# Patient Record
Sex: FEMALE | Race: WHITE | NOT HISPANIC OR LATINO | Employment: UNEMPLOYED | URBAN - METROPOLITAN AREA
[De-identification: names, ages, dates, MRNs, and addresses within clinical notes are randomized per-mention and may not be internally consistent; named-entity substitution may affect disease eponyms.]

---

## 2017-07-25 ENCOUNTER — OFFICE VISIT (OUTPATIENT)
Dept: OPHTHALMOLOGY | Facility: CLINIC | Age: 22
End: 2017-07-25
Attending: OPHTHALMOLOGY
Payer: COMMERCIAL

## 2017-07-25 DIAGNOSIS — H35.103 ROP (RETINOPATHY OF PREMATURITY), BILATERAL: Primary | ICD-10-CM

## 2017-07-25 PROCEDURE — 92015 DETERMINE REFRACTIVE STATE: CPT | Mod: ZF

## 2017-07-25 PROCEDURE — 99213 OFFICE O/P EST LOW 20 MIN: CPT

## 2017-07-25 PROCEDURE — 92250 FUNDUS PHOTOGRAPHY W/I&R: CPT | Mod: ZF | Performed by: OPHTHALMOLOGY

## 2017-07-25 ASSESSMENT — REFRACTION_MANIFEST
OD_AXIS: 005
OS_CYLINDER: +1.00
OD_SPHERE: -18.00
OD_CYLINDER: +3.00
OS_AXIS: 080

## 2017-07-25 ASSESSMENT — CUP TO DISC RATIO
OD_RATIO: NO SIGNIFICANT CUPPING WITH GLIMPSES
OS_RATIO: NO SIGNIFICANT CUPPING WITH GLIMPSES

## 2017-07-25 ASSESSMENT — EXTERNAL EXAM - RIGHT EYE: OD_EXAM: MILD PTOSIS

## 2017-07-25 ASSESSMENT — TONOMETRY
OD_IOP_MMHG: 09
IOP_METHOD: TONOPEN
OS_IOP_MMHG: 11

## 2017-07-25 ASSESSMENT — VISUAL ACUITY
METHOD: SNELLEN - LINEAR
OD_CC: 20/200
OS_CC: 20/70
CORRECTION_TYPE: GLASSES
OS_CC+: -2

## 2017-07-25 ASSESSMENT — CONF VISUAL FIELD
OS_INFERIOR_TEMPORAL_RESTRICTION: 3
OS_INFERIOR_NASAL_RESTRICTION: 3
OS_SUPERIOR_NASAL_RESTRICTION: 3
OS_SUPERIOR_TEMPORAL_RESTRICTION: 3

## 2017-07-25 ASSESSMENT — REFRACTION_WEARINGRX
OD_AXIS: 005
OD_CYLINDER: +3.00
OD_SPHERE: -19.00
OS_SPHERE: -15.00
OS_AXIS: 180
OS_CYLINDER: +1.00

## 2017-07-25 ASSESSMENT — EXTERNAL EXAM - LEFT EYE: OS_EXAM: MILD PTOSIS

## 2017-07-25 ASSESSMENT — SLIT LAMP EXAM - LIDS
COMMENTS: NORMAL
COMMENTS: NORMAL

## 2017-07-25 NOTE — NURSING NOTE
Chief Complaints and History of Present Illnesses   Patient presents with     Follow Up For     Cicatricial ROP, both eyes     HPI    Affected eye(s):  Both   Symptoms:     No decreased vision   No Dryness   No itching         Do you have eye pain now?:  No      Comments:  Annual follow up for Cicatricial ROP, both eyes.  The patient notes she is doing well and her vision is stable.  PRINCESS Mcneill 3:12 PM 07/25/2017

## 2017-07-25 NOTE — MR AVS SNAPSHOT
After Visit Summary   2017    Belen Erickson    MRN: [id number]           Patient Information     Date Of Birth          1995        Visit Information        Provider Department      2017 3:15 PM [facility]        Today's Diagnoses     ROP (retinopathy of prematurity), bilateral    -  1       Follow-ups after your visit        Follow-up notes from your care team     Return in about 1 year (around 2018) for Fundus photos OU (Optos).      Future tests that were ordered for you today     Open Future Orders        Priority Expected Expires Ordered    Fundus Photos OU (both eyes) Routine  2019            Who to contact     Please call your clinic at [phone] to:    Ask questions about your health    Make or cancel appointments    Discuss your medicines    Learn about your test results    Speak to your doctor   If you have compliments or concerns about an experience at your clinic, or if you wish to file a complaint, please contact [facility] Patient Relations at [phone] or email us at [email or web address]         Additional Information About Your Visit        MyChart Information     Bookya is an electronic gateway that provides easy, online access to your medical records. With Bookya, you can request a clinic appointment, read your test results, renew a prescription or communicate with your care team.     To sign up for Bookya visit the website at [email or web address]   You will be asked to enter the access code listed below, as well as some personal information. Please follow the directions to create your username and password.     Your access code is: [email or web address]  Expires: 10/9/2017  6:31 AM     Your access code will  in 90 days. If you need help or a new code, please contact your [facility] or call [phone] for assistance.        Care  EveryWhere ID     This is your Care EveryWhere ID. This could be used by other organizations to access your [facility] medical records  [id number]         Blood Pressure from Last 3 Encounters:   No data found for BP    Weight from Last 3 Encounters:   No data found for Wt              We Performed the Following     Fundus Photos OU (both eyes)        Primary Care Provider Office Phone # Fax #    Alejandra Johnson [phone]       [address] [address]        Equal Access to Services     WOLFGANG SMITH : Hadii aad ku hadasho Soomaali, waaxda luqadaha, qaybta kaalmada adeegyada, waxay idiin hayrhiannonn jose braga . So [facility] [phone].    ATENCIÓN: Si habla español, tiene a barnett disposición servicios gratuitos de asistencia lingüística. [facility] [phone].    We comply with applicable federal civil rights laws and [place] laws. We do not discriminate on the basis of race, color, national origin, age, disability sex, sexual orientation or gender identity.            Thank you!     Thank you for choosing [facility]  for your care. Our goal is always to provide you with excellent care. Hearing back from our patients is one way we can continue to improve our services. Please take a few minutes to complete the written survey that you may receive in the mail after your visit with us. Thank you!             Your Updated Medication List - Protect others around you: Learn how to safely use, store and throw away your medicines at www.disposemymeds.org.      Notice  As of 7/25/2017  6:09 PM    You have not been prescribed any medications.

## 2017-07-25 NOTE — PROGRESS NOTES
CC -   F/u cicatricial ROP    INTERVAL HISTORY -   No changes since MING  Chronic photophobia. Denies flashes or floaters. Wears glasses full time, high myope. Father often notes slight head tilt, looking over glasses.       HPI -   Belen Erickson is a  22 year old year-old patient presenting for annual evaluation of cicatricial ROP. Followed with Dr Oliva as well.        PAST OCULAR SURGERY  Strabismus surgery- 2001 and 2004  Amblyopia, right eye  High myopia  Microcornea  Esotropia  Nystagmus        ASSESSMENT & PLAN    1. 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  Amblyopia OD      3. Esotropia, torticollis, and multivector nystagmus   -s/p strabismus surgery at ages 4 and 6- stable   -continue to monitor      4.High myopia and astigmatism   -last cycloplegic refraction (2015)      return to clinic: 1 year, photos OU (Optos)    Raúl Clark MD  PGY2, Ophthalmology      ATTESTATION     Attending Physician Attestation:      Complete documentation of historical and exam elements from today's encounter can be found in the full encounter summary report (not reduplicated in this progress note).  I personally obtained the chief complaint(s) and history of present illness.  I confirmed and edited as necessary the review of systems, past medical/surgical history, family history, social history, and examination findings as documented by others; and I examined the patient myself.  I personally reviewed the relevant tests, images,  and reports as documented above.  I formulated and edited as necessary the assessment and plan and discussed the findings and management plan with the patient and family        Rebeca Oliver MD, PhD  , Vitreoretinal Surgery  Department of Ophthalmology  [facility]

## 2017-08-16 ENCOUNTER — TELEPHONE (OUTPATIENT)
Dept: OPHTHALMOLOGY | Facility: CLINIC | Age: 22
End: 2017-08-16

## 2017-08-16 NOTE — TELEPHONE ENCOUNTER
----- Message from Jessica Vaughan sent at 8/16/2017  3:01 PM CDT -----  Regarding: Pt father calling asking for pt eye glass Rx to be sent to address  Contact: [phone]  Pt father calling asking for pt eye glass Rx. To be sent to address on file.     Thank You,  Jessica    Please DO NOT send this message and/or reply back to sender.  Call Center Representatives DO NOT respond to messages.

## 2018-07-17 ENCOUNTER — OFFICE VISIT (OUTPATIENT)
Dept: OPHTHALMOLOGY | Facility: CLINIC | Age: 23
End: 2018-07-17
Attending: OPHTHALMOLOGY
Payer: COMMERCIAL

## 2018-07-17 DIAGNOSIS — H35.103 ROP (RETINOPATHY OF PREMATURITY), BILATERAL: ICD-10-CM

## 2018-07-17 PROCEDURE — G0463 HOSPITAL OUTPT CLINIC VISIT: HCPCS | Mod: ZF

## 2018-07-17 PROCEDURE — 92250 FUNDUS PHOTOGRAPHY W/I&R: CPT | Mod: ZF | Performed by: OPHTHALMOLOGY

## 2018-07-17 ASSESSMENT — TONOMETRY
OS_IOP_MMHG: 13
OD_IOP_MMHG: 16
IOP_METHOD: TONOPEN

## 2018-07-17 ASSESSMENT — REFRACTION_WEARINGRX
OD_CYLINDER: +3.00
OS_AXIS: 180
OD_AXIS: 005
OS_SPHERE: -15.00
OS_CYLINDER: +1.00
OD_SPHERE: -19.00

## 2018-07-17 ASSESSMENT — VISUAL ACUITY
OD_CC: 20/150
CORRECTION_TYPE: GLASSES
OS_CC: 20/60
OS_CC+: -1
METHOD: SNELLEN - LINEAR

## 2018-07-17 ASSESSMENT — EXTERNAL EXAM - RIGHT EYE: OD_EXAM: MILD PTOSIS

## 2018-07-17 ASSESSMENT — SLIT LAMP EXAM - LIDS
COMMENTS: NORMAL
COMMENTS: NORMAL

## 2018-07-17 ASSESSMENT — EXTERNAL EXAM - LEFT EYE: OS_EXAM: MILD PTOSIS

## 2018-07-17 ASSESSMENT — CUP TO DISC RATIO
OD_RATIO: NO SIGNIFICANT CUPPING WITH GLIMPSES
OS_RATIO: NO SIGNIFICANT CUPPING WITH GLIMPSES

## 2018-07-17 NOTE — MR AVS SNAPSHOT
After Visit Summary   2018    Belen Erickson    MRN: [id number]           Patient Information     Date Of Birth          1995        Visit Information        Provider Department      2018 9:45 AM [facility]        Today's Diagnoses     ROP (retinopathy of prematurity), bilateral - Both Eyes           Follow-ups after your visit        Follow-up notes from your care team     Return in about 1 year (around 2019) for DFE, Optos, Fundus photos.      Future tests that were ordered for you today     Open Future Orders        Priority Expected Expires Ordered    Fundus Photos OU (both eyes) Routine  2020    Fundus Photos OU (both eyes) Routine  2020            Who to contact     Please call your clinic at [phone] to:    Ask questions about your health    Make or cancel appointments    Discuss your medicines    Learn about your test results    Speak to your doctor            Additional Information About Your Visit        MyChart Information     USINE IO is an electronic gateway that provides easy, online access to your medical records. With USINE IO, you can request a clinic appointment, read your test results, renew a prescription or communicate with your care team.     To sign up for USINE IO visit the website at [email or web address]   You will be asked to enter the access code listed below, as well as some personal information. Please follow the directions to create your username and password.     Your access code is: [email or web address]  Expires: 10/1/2018  6:31 AM     Your access code will  in 90 days. If you need help or a new code, please contact your [facility] or call [phone] for assistance.        Care EveryWhere ID     This is your Care EveryWhere ID. This could be used by other organizations to access your [facility] medical records  [id number]         Blood Pressure from Last  3 Encounters:   No data found for BP    Weight from Last 3 Encounters:   No data found for Wt              We Performed the Following     Fundus Photos OU (both eyes)        Primary Care Provider Office Phone # Fax #    Alejandra Johnson [phone]       [address] [address]        Equal Access to Services     WOLFGANG SMITH : Hadii aad ku hadasho Soomaali, waaxda luqadaha, qaybta kaalmada adeegyada, waxay richiein hayaan jose braga . So [facility] [phone].    ATENCIÓN: Si habla español, tiene a barnett disposición servicios gratuitos de asistencia lingüística. iVvame al [phone].    We comply with applicable federal civil rights laws and [place] laws. We do not discriminate on the basis of race, color, national origin, age, disability, sex, sexual orientation, or gender identity.            Thank you!     Thank you for choosing [facility]  for your care. Our goal is always to provide you with excellent care. Hearing back from our patients is one way we can continue to improve our services. Please take a few minutes to complete the written survey that you may receive in the mail after your visit with us. Thank you!             Your Updated Medication List - Protect others around you: Learn how to safely use, store and throw away your medicines at www.disposemymeds.org.      Notice  As of 7/17/2018 11:01 AM    You have not been prescribed any medications.

## 2018-07-17 NOTE — NURSING NOTE
Chief Complaints and History of Present Illnesses   Patient presents with     Follow Up For     1 year follow up Cicatricial ROP, both eyes     HPI    Affected eye(s):  Both   Symptoms:     No floaters   No flashes   No redness   No tearing   No Dryness         Do you have eye pain now?:  No      Comments:  Pt states vision is the same as last visit. No eye pain today.    Marques HILL July 17, 2018 10:05 AM

## 2018-07-17 NOTE — PROGRESS NOTES
CC -   F/u cicatricial ROP    INTERVAL HISTORY -   No changes since MING  Chronic photophobia. Denies flashes or floaters. Wears glasses full time, high myope. Father often notes slight head tilt, looking over glasses.       HPI -   Belen Erickson is a  23 year old year-old patient presenting for annual evaluation of cicatricial ROP. Followed with Dr Oliva as well.   Very photosensitive and has trouble with DFE       PAST OCULAR SURGERY  Strabismus surgery- 2001 and 2004  Amblyopia, right eye  High myopia  Microcornea  Esotropia  Nystagmus    RETINAL IMAGING  Optos fundus photos 7/17/18  Right eye blonde fundus, tortuous vessels, attached  Left eye blonde fundus, tortuous vessels, attached      ASSESSMENT & PLAN    1. 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  Amblyopia OD      3. Esotropia, torticollis, and multivector nystagmus   -s/p strabismus surgery at ages 4 and 6- stable   -continue to monitor      4.High myopia and astigmatism   -last cycloplegic refraction (2015)      return to clinic: 1 year, photos OU (Optos)    Irvin Bland MD, PhD  Vitreoretinal Surgery Fellow      ATTESTATION     Attending Physician Attestation:      Complete documentation of historical and exam elements from today's encounter can be found in the full encounter summary report (not reduplicated in this progress note).  I personally obtained the chief complaint(s) and history of present illness.  I confirmed and edited as  necessary the review of systems, past medical/surgical history, family history, social history, and examination findings as documented by others; and I examined the patient myself.  I personally reviewed the relevant tests, images, and reports as documented above.  I personally reviewed the ophthalmic test(s) associated with this encounter, agree with the interpretation(s) as documented by the resident/fellow, and have edited the corresponding report(s) as necessary.   I formulated and edited as necessary the assessment and plan and discussed the findings and management plan with the patient and family    Rebeca Oliver MD, PhD  , Vitreoretinal Surgery  Department of Ophthalmology  [facility]

## 2019-04-22 ENCOUNTER — TELEPHONE (OUTPATIENT)
Dept: OPHTHALMOLOGY | Facility: CLINIC | Age: 24
End: 2019-04-22

## 2019-04-22 NOTE — TELEPHONE ENCOUNTER
[facility] Call Center    Phone Message    May a detailed message be left on voicemail: yes    Reason for Call: Other: Pt needs a new pair of glasses and the dad would like to come into the clinic to pick it up. Please call the home ph. to confirm as soon as possible     Action Taken: Message routed to:  [facility] ([facility] see above

## 2019-04-23 NOTE — TELEPHONE ENCOUNTER
[facility] Call Center    Phone Message    May a detailed message be left on voicemail: yes    Reason for Call: Other: The pt's dad called - please fax the glasses RX to [facility] in [facility] at F [phone]. Thanks.     Action Taken: Message routed to:  [facility] ([facility]

## 2019-07-16 ENCOUNTER — OFFICE VISIT (OUTPATIENT)
Dept: OPHTHALMOLOGY | Facility: CLINIC | Age: 24
End: 2019-07-16
Attending: OPHTHALMOLOGY
Payer: COMMERCIAL

## 2019-07-16 DIAGNOSIS — H35.103 ROP (RETINOPATHY OF PREMATURITY), BILATERAL: Primary | ICD-10-CM

## 2019-07-16 PROCEDURE — 92250 FUNDUS PHOTOGRAPHY W/I&R: CPT | Mod: ZF | Performed by: OPHTHALMOLOGY

## 2019-07-16 PROCEDURE — G0463 HOSPITAL OUTPT CLINIC VISIT: HCPCS | Mod: ZF

## 2019-07-16 ASSESSMENT — TONOMETRY
OS_IOP_MMHG: 15
IOP_METHOD: TONOPEN
OD_IOP_MMHG: 13

## 2019-07-16 ASSESSMENT — CONF VISUAL FIELD
OS_SUPERIOR_TEMPORAL_RESTRICTION: 3
OS_INFERIOR_NASAL_RESTRICTION: 3
OS_INFERIOR_TEMPORAL_RESTRICTION: 3
OS_SUPERIOR_NASAL_RESTRICTION: 3

## 2019-07-16 ASSESSMENT — EXTERNAL EXAM - RIGHT EYE: OD_EXAM: MILD PTOSIS

## 2019-07-16 ASSESSMENT — VISUAL ACUITY
CORRECTION_TYPE: GLASSES
OS_CC: 20/70
METHOD: SNELLEN - LINEAR
OD_CC: 20/300

## 2019-07-16 ASSESSMENT — SLIT LAMP EXAM - LIDS
COMMENTS: NORMAL
COMMENTS: NORMAL

## 2019-07-16 ASSESSMENT — REFRACTION_WEARINGRX
OD_SPHERE: -18.00
OD_AXIS: 005
OS_SPHERE: -15.00
SPECS_TYPE: SVL
OD_CYLINDER: +3.00
OS_AXIS: 081
OS_CYLINDER: +1.00

## 2019-07-16 ASSESSMENT — CUP TO DISC RATIO
OS_RATIO: NO SIGNIFICANT CUPPING WITH GLIMPSES
OD_RATIO: NO SIGNIFICANT CUPPING WITH GLIMPSES

## 2019-07-16 ASSESSMENT — EXTERNAL EXAM - LEFT EYE: OS_EXAM: MILD PTOSIS

## 2019-07-16 NOTE — PROGRESS NOTES
CC -   F/u cicatricial ROP    INTERVAL HISTORY -   No changes since MING. Vision seems stable.   Chronic photophobia. Denies flashes or floaters. Wears glasses full time, high myope. Father often notes slight head tilt, looking over glasses frequently. Pt in school at [employer], gets vision assistance. 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       HPI -   Belen Erickson is a  24 year old year-old patient presenting for annual evaluation of cicatricial ROP. Was followed by Dr Oliva.  Very photosensitive and has trouble with DFE       PAST OCULAR SURGERY  Strabismus surgery- 2001 and 2004      RETINAL IMAGING  Optos fundus photos 7-16-19  Poor quality, pt very photophobic - narrow views through lids  Right eye: could not tolerate photos  Left eye: blonde fundus, tortuous vessels, attached    FAF 7-16-19  Right eye: could not tolerate  Left eye: temporal periphery w/ mild diffuse hyperautofluorescence, poor views      ASSESSMENT & PLAN    1. 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, no treatment of ROP   - with ectopic maculae and straightened vessels   - low vision- goes to [employer], gets large print materials     - VA worse in right eye - 20/300 today, 20/150 in 2018.    - left eye sl worse - 20/70 today, 20/60 last year.    - no subjective change     - very difficult exam d/t tendency to turn eye extreme left under lids   - limited exam shows retina flat, likely stable   - Optos photos low reliability today d/t photophobia     - observe for now   - recheck 1 year   - if chronically unable to get eye exam may need to get EUA or possible valium    2.  Amblyopia OD      3. Esotropia, torticollis, and multivector nystagmus   -s/p strabismus surgery at ages 4 and 6- stable   -continue to monitor      4.High myopia and astigmatism        return to clinic: 1 year, photos OU (Optos)      Ramo Penaloza MD - PGY 3  Ophthalmology resident      ATTESTATION     Attending Physician Attestation:       Complete documentation of historical and exam elements from today's encounter can be found in the full encounter summary report (not reduplicated in this progress note).  I personally obtained the chief complaint(s) and history of present illness.  I confirmed and edited as necessary the review of systems, past medical/surgical history, family history, social history, and examination findings as documented by others; and I examined the patient myself.  I personally reviewed the relevant tests, images, and reports as documented above.  I personally reviewed the ophthalmic test(s) associated with this encounter, agree with the interpretation(s) as documented by the resident/fellow, and have edited the corresponding report(s) as necessary.   I formulated and edited as necessary the assessment and plan and discussed the findings and management plan with the patient and family    Rebeca Oliver MD, PhD  , Vitreoretinal Surgery  Department of Ophthalmology  [facility]

## 2019-07-16 NOTE — NURSING NOTE
Chief Complaints and History of Present Illnesses   Patient presents with     Retinopathy Of Prematurity Follow Up     Chief Complaint(s) and History of Present Illness(es)     Retinopathy Of Prematurity Follow Up     Laterality: both eyes    Pain scale: 0/10              Comments     Pt here for annual f/u of cicatricial ROP, both eyes  Pt reports vision is the same  No flashes/floaters  No drops    Delilah SÁNCHEZ 8:07 AM July 16, 2019

## 2020-07-21 ENCOUNTER — OFFICE VISIT (OUTPATIENT)
Dept: OPHTHALMOLOGY | Facility: CLINIC | Age: 25
End: 2020-07-21
Attending: OPHTHALMOLOGY
Payer: COMMERCIAL

## 2020-07-21 DIAGNOSIS — H35.103 ROP (RETINOPATHY OF PREMATURITY), BILATERAL: ICD-10-CM

## 2020-07-21 PROCEDURE — 92250 FUNDUS PHOTOGRAPHY W/I&R: CPT | Mod: ZF | Performed by: OPHTHALMOLOGY

## 2020-07-21 PROCEDURE — 92015 DETERMINE REFRACTIVE STATE: CPT | Mod: ZF

## 2020-07-21 PROCEDURE — G0463 HOSPITAL OUTPT CLINIC VISIT: HCPCS | Mod: ZF

## 2020-07-21 ASSESSMENT — REFRACTION_MANIFEST
OS_CYLINDER: +1.50
OS_SPHERE: -15.50
OD_AXIS: 005
OD_CYLINDER: +3.50
OS_AXIS: 075
OD_SPHERE: -18.00

## 2020-07-21 ASSESSMENT — EXTERNAL EXAM - LEFT EYE: OS_EXAM: MILD PTOSIS

## 2020-07-21 ASSESSMENT — REFRACTION_WEARINGRX
OD_SPHERE: -18.00
OS_AXIS: 081
OD_AXIS: 005
OS_SPHERE: -15.00
OS_CYLINDER: +1.00
OD_CYLINDER: +3.00
SPECS_TYPE: SVL

## 2020-07-21 ASSESSMENT — CONF VISUAL FIELD
OS_INFERIOR_TEMPORAL_RESTRICTION: 3
OS_INFERIOR_NASAL_RESTRICTION: 3
OS_SUPERIOR_NASAL_RESTRICTION: 3
OD_INFERIOR_NASAL_RESTRICTION: 3
OD_SUPERIOR_NASAL_RESTRICTION: 3
OD_SUPERIOR_TEMPORAL_RESTRICTION: 3
OD_INFERIOR_TEMPORAL_RESTRICTION: 3
OS_SUPERIOR_TEMPORAL_RESTRICTION: 3

## 2020-07-21 ASSESSMENT — VISUAL ACUITY
METHOD: SNELLEN - LINEAR
OS_CC: 20/70 SLOW
OD_CC: 20/300
OS_CC+: -2
CORRECTION_TYPE: GLASSES

## 2020-07-21 ASSESSMENT — TONOMETRY
OS_IOP_MMHG: 15
OD_IOP_MMHG: 15
IOP_METHOD: TONOPEN

## 2020-07-21 ASSESSMENT — CUP TO DISC RATIO
OD_RATIO: NO SIGNIFICANT CUPPING WITH GLIMPSES
OS_RATIO: NO SIGNIFICANT CUPPING WITH GLIMPSES

## 2020-07-21 ASSESSMENT — SLIT LAMP EXAM - LIDS
COMMENTS: NORMAL
COMMENTS: NORMAL

## 2020-07-21 ASSESSMENT — EXTERNAL EXAM - RIGHT EYE: OD_EXAM: MILD PTOSIS

## 2020-07-21 NOTE — PROGRESS NOTES
CC -   F/u cicatricial ROP    INTERVAL HISTORY -   No changes since MING. Vision seems stable.       HPI -   Belen Erickson is a  25 year old year-old patient presenting for annual evaluation of cicatricial ROP. Was followed by Dr Oliva.  Very photosensitive and has trouble with DFE  Father often notes slight head tilt, looking over glasses frequently  Was student at [employer]       PAST OCULAR SURGERY  Strabismus surgery- 2001 and 2004      RETINAL IMAGING  Optos fundus photos 7-21-20  moderate quality, pt very photophobic - narrow views through lids  OD: blonde fundus, tortuous vessels, attached  OS: blonde fundus, tortuous vessels, attached    FAF 7-16-19  Right eye: could not tolerate  Left eye: temporal periphery w/ mild diffuse hyperautofluorescence, poor views      ASSESSMENT & PLAN    1. 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, no treatment of ROP   - with ectopic maculae and straightened vessels   - low vision- went to [facility] [employer] [facility], gets large print materials     - VA stable OU within typical range   - no subjective change     - very difficult exam d/t tendency to turn eye extreme left under lids   - limited exam shows retina flat, likely stable   - Optos photos obtained d/t difficulty with DFE     - observe for now   - recheck 1 year    2.  Amblyopia OD      3. Esotropia, torticollis, and multivector nystagmus   -s/p strabismus surgery at ages 4 and 6- stable   -continue to monitor      4.High myopia and astigmatism        return to clinic: 1 year, photos OU (Optos), OCT OU          ATTESTATION     Attending Physician Attestation:      Complete documentation of historical and exam elements from today's encounter can be found in the full encounter summary report (not reduplicated in this progress note).  I personally obtained the chief complaint(s) and history of present illness.  I confirmed and edited as necessary the review of systems, past medical/surgical history, family  history, social history, and examination findings as documented by others; and I examined the patient myself.  I personally reviewed the relevant tests, images, and reports as documented above.  I formulated and edited as necessary the assessment and plan and discussed the findings and management plan with the patient and family    Rebeca Oliver MD, PhD  , Vitreoretinal Surgery  Department of Ophthalmology  [facility]

## 2020-07-21 NOTE — NURSING NOTE
Chief Complaint(s) and History of Present Illness(es)     Retinopathy Of Prematurity Follow Up     In both eyes.  Associated symptoms include Negative for eye pain.  Pain was noted as 0/10.              Comments     Yearly f/u for Cicatricial ROP, both eyes. Pt notes for the most part no real big changes in her vision x the last year. Pt denies any issues with her eyes like, dryness, redness, and tearing.     Ocular meds: none    Teodora Robles, SERGIO 7:38 AM July 21, 2020

## 2020-09-03 ENCOUNTER — MEDICAL CORRESPONDENCE (OUTPATIENT)
Dept: HEALTH INFORMATION MANAGEMENT | Facility: CLINIC | Age: 25
End: 2020-09-03

## 2020-12-02 ENCOUNTER — MEDICAL CORRESPONDENCE (OUTPATIENT)
Dept: HEALTH INFORMATION MANAGEMENT | Facility: CLINIC | Age: 25
End: 2020-12-02

## 2020-12-02 ENCOUNTER — TRANSFERRED RECORDS (OUTPATIENT)
Dept: HEALTH INFORMATION MANAGEMENT | Facility: CLINIC | Age: 25
End: 2020-12-02

## 2021-04-02 ENCOUNTER — TRANSFERRED RECORDS (OUTPATIENT)
Dept: HEALTH INFORMATION MANAGEMENT | Facility: CLINIC | Age: 26
End: 2021-04-02

## 2021-07-06 ENCOUNTER — MEDICAL CORRESPONDENCE (OUTPATIENT)
Dept: HEALTH INFORMATION MANAGEMENT | Facility: CLINIC | Age: 26
End: 2021-07-06

## 2021-07-26 DIAGNOSIS — H35.103 ROP (RETINOPATHY OF PREMATURITY), BILATERAL: Primary | ICD-10-CM

## 2021-08-13 ENCOUNTER — OFFICE VISIT (OUTPATIENT)
Dept: OPHTHALMOLOGY | Facility: CLINIC | Age: 26
End: 2021-08-13
Attending: OPHTHALMOLOGY
Payer: COMMERCIAL

## 2021-08-13 DIAGNOSIS — H35.103 ROP (RETINOPATHY OF PREMATURITY), BILATERAL: ICD-10-CM

## 2021-08-13 PROCEDURE — 99207 FUNDUS PHOTOS OU (BOTH EYES): CPT | Mod: 26 | Performed by: OPHTHALMOLOGY

## 2021-08-13 PROCEDURE — 92134 CPTRZ OPH DX IMG PST SGM RTA: CPT | Performed by: OPHTHALMOLOGY

## 2021-08-13 PROCEDURE — G0463 HOSPITAL OUTPT CLINIC VISIT: HCPCS

## 2021-08-13 PROCEDURE — 92250 FUNDUS PHOTOGRAPHY W/I&R: CPT | Performed by: OPHTHALMOLOGY

## 2021-08-13 PROCEDURE — 99213 OFFICE O/P EST LOW 20 MIN: CPT | Performed by: OPHTHALMOLOGY

## 2021-08-13 RX ORDER — KETOCONAZOLE 20 MG/ML
1 SHAMPOO TOPICAL 2 TIMES DAILY PRN
COMMUNITY
Start: 2020-07-30

## 2021-08-13 ASSESSMENT — SLIT LAMP EXAM - LIDS
COMMENTS: NORMAL
COMMENTS: NORMAL

## 2021-08-13 ASSESSMENT — CONF VISUAL FIELD
OD_INFERIOR_NASAL_RESTRICTION: 3
OS_SUPERIOR_NASAL_RESTRICTION: 3
OS_SUPERIOR_TEMPORAL_RESTRICTION: 3
OD_INFERIOR_TEMPORAL_RESTRICTION: 3
OD_SUPERIOR_TEMPORAL_RESTRICTION: 3
METHOD: COUNTING FINGERS
OS_INFERIOR_NASAL_RESTRICTION: 3
OD_SUPERIOR_NASAL_RESTRICTION: 3
OS_INFERIOR_TEMPORAL_RESTRICTION: 3

## 2021-08-13 ASSESSMENT — VISUAL ACUITY
OD_CC+: -2
CORRECTION_TYPE: GLASSES
OD_CC: 20/200
METHOD: SNELLEN - LINEAR
OS_CC: 20/70

## 2021-08-13 ASSESSMENT — EXTERNAL EXAM - LEFT EYE: OS_EXAM: MILD PTOSIS

## 2021-08-13 ASSESSMENT — CUP TO DISC RATIO
OD_RATIO: NO SIGNIFICANT CUPPING WITH GLIMPSES
OS_RATIO: NO SIGNIFICANT CUPPING WITH GLIMPSES

## 2021-08-13 ASSESSMENT — EXTERNAL EXAM - RIGHT EYE: OD_EXAM: MILD PTOSIS

## 2021-08-13 ASSESSMENT — TONOMETRY
OS_IOP_MMHG: 8
IOP_METHOD: TONOPEN
OD_IOP_MMHG: 11

## 2021-08-13 NOTE — NURSING NOTE
Chief Complaints and History of Present Illnesses   Patient presents with     Retinopathy Of Prematurity Follow Up     Cicatricial ROP, both eyes     Chief Complaint(s) and History of Present Illness(es)     Retinopathy Of Prematurity Follow Up     Laterality: both eyes    Onset: present since childhood    Course: stable    Associated symptoms: Negative for headaches, blurred vision, eye pain and tingling    Pain scale: 0/10    Comments: Cicatricial ROP, both eyes              Comments     Cicatricial ROP, both eyes  Pt present with father.  Pt states VA seems the same and eyes are comfortable. States no use of eye meds or art tears.  SIOBHAN Ram COT 7:55 AM 08/13/2021

## 2021-08-13 NOTE — PROGRESS NOTES
CC -   F/u cicatricial ROP    INTERVAL HISTORY -   No changes since MING. Vision seems stable.       [facility] -   Belen Erickson is a  26  year old year-old patient presenting for annual evaluation of cicatricial ROP. Was followed by Dr Oliva.  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  Has seen low vision (2017)       PAST OCULAR SURGERY  Strabismus surgery- 2001 and 2004      RETINAL IMAGING    Optos fundus photos 8-13-21  moderate quality, pt very photophobic - narrow views through lids  OD: blonde fundus, tortuous vessels, attached, area of hyperfluoresence is poorly viewed on optos  OS: blonde fundus, tortuous vessels, attached    FAF 08/13/21  Right eye: temporal scarring wtith patchy hyperfluoresence, vasculature does not reach temporal periphery  Left eye: temporal periphery w/ mild diffuse hyperautofluorescence, vasculature does not reach the periphery temporallypoor views      OCT 8-13-21  OD - limited views, PHF attached, retina normal where visible  OS -  limited views, PHF attached, retina normal where visible    ASSESSMENT & PLAN    #. 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, no treatment of ROP   - with ectopic maculae and straightened vessels     - VA stable OU within typical range   - no subjective change     - very difficult exam d/t tendency to turn eye extreme left under lids   - limited exam shows retina flat, likely stable   - Optos photos obtained d/t difficulty with DFE     - observe for now   - recheck 1 year    #  Amblyopia OD      # Esotropia, torticollis, and multivector nystagmus   -s/p strabismus surgery at ages 4 and 6- stable   -continue to monitor      4.High myopia and astigmatism        return to clinic: 1 year, photos OU (Optos)          ATTESTATION     Attending Physician Attestation:      Complete documentation of historical and exam elements from  today's encounter can be found in the full encounter summary report (not reduplicated in this progress note).  I personally obtained the chief complaint(s) and history of present illness.  I confirmed and edited as necessary the review of systems, past medical/surgical history, family history, social history, and examination findings as documented by others; and I examined the patient myself.  I personally reviewed the relevant tests, images, and reports as documented above.  I personally reviewed the ophthalmic test(s) associated with this encounter, agree with the interpretation(s) as documented by the resident/fellow, and have edited the corresponding report(s) as necessary.   I formulated and edited as necessary the assessment and plan and discussed the findings and management plan with the patient and family    Rebeca Oliver MD, PhD  , Vitreoretinal Surgery  Department of Ophthalmology  [facility]

## 2021-11-03 ENCOUNTER — TELEPHONE (OUTPATIENT)
Dept: OPHTHALMOLOGY | Facility: CLINIC | Age: 26
End: 2021-11-03

## 2021-11-03 NOTE — TELEPHONE ENCOUNTER
I mailed Cindi her Eyeglass Rx to the address on file.     Renea Garcia Communication Facilitator on 11/3/2021 at 12:51 PM

## 2021-11-03 NOTE — TELEPHONE ENCOUNTER
[facility] Call Center    Phone Message    May a detailed message be left on voicemail: yes     Reason for Call: Form or Letter   Type or form/letter needing completion: Eyeglass Rx  Provider: Dr. Oliver  Date form needed: ASAP  Once completed: Mail form to Name: Belen Erickson, at Address: [address] , [place] [address]      Action Taken: Message routed to:  [facility] ([facility] EYE    Travel Screening: Not Applicable

## 2022-08-04 DIAGNOSIS — H35.103 ROP (RETINOPATHY OF PREMATURITY), BILATERAL: Primary | ICD-10-CM

## 2022-08-16 ENCOUNTER — OFFICE VISIT (OUTPATIENT)
Dept: OPHTHALMOLOGY | Facility: CLINIC | Age: 27
End: 2022-08-16
Attending: OPHTHALMOLOGY
Payer: MEDICARE

## 2022-08-16 DIAGNOSIS — H35.103 ROP (RETINOPATHY OF PREMATURITY), BILATERAL: ICD-10-CM

## 2022-08-16 PROCEDURE — 92250 FUNDUS PHOTOGRAPHY W/I&R: CPT | Performed by: OPHTHALMOLOGY

## 2022-08-16 PROCEDURE — 99213 OFFICE O/P EST LOW 20 MIN: CPT | Performed by: OPHTHALMOLOGY

## 2022-08-16 PROCEDURE — G0463 HOSPITAL OUTPT CLINIC VISIT: HCPCS | Mod: 25

## 2022-08-16 ASSESSMENT — CONF VISUAL FIELD
OS_SUPERIOR_TEMPORAL_RESTRICTION: 3
OD_INFERIOR_TEMPORAL_RESTRICTION: 3
OS_SUPERIOR_NASAL_RESTRICTION: 3
OD_SUPERIOR_NASAL_RESTRICTION: 3
OS_INFERIOR_TEMPORAL_RESTRICTION: 3
METHOD: COUNTING FINGERS
OS_INFERIOR_NASAL_RESTRICTION: 3
OD_INFERIOR_NASAL_RESTRICTION: 3
OD_SUPERIOR_TEMPORAL_RESTRICTION: 3

## 2022-08-16 ASSESSMENT — SLIT LAMP EXAM - LIDS
COMMENTS: NORMAL
COMMENTS: NORMAL

## 2022-08-16 ASSESSMENT — VISUAL ACUITY
OS_CC: 20/70
OD_CC: 20/200
CORRECTION_TYPE: GLASSES
OD_CC+: +2
METHOD: SNELLEN - LINEAR

## 2022-08-16 ASSESSMENT — EXTERNAL EXAM - LEFT EYE: OS_EXAM: MILD PTOSIS

## 2022-08-16 ASSESSMENT — CUP TO DISC RATIO
OD_RATIO: NO SIGNIFICANT CUPPING WITH GLIMPSES
OS_RATIO: NO SIGNIFICANT CUPPING WITH GLIMPSES

## 2022-08-16 ASSESSMENT — TONOMETRY
OD_IOP_MMHG: 15
IOP_METHOD: ICARE
OS_IOP_MMHG: 12

## 2022-08-16 ASSESSMENT — EXTERNAL EXAM - RIGHT EYE: OD_EXAM: MILD PTOSIS

## 2022-08-16 NOTE — PROGRESS NOTES
CC -  cicatricial ROP    INTERVAL HISTORY -   No changes since MING. Vision seems stable.       [facility] -   Belen Erickson is a 27 year old  patient presenting for annual evaluation of cicatricial ROP. Was followed by Dr Oliva.  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  Has seen low vision (2017)       PAST OCULAR SURGERY  Strabismus surgery- 2001 and 2004      RETINAL IMAGING  Optos fundus photos 8-13-21  moderate quality, pt very photophobic - narrow views through lids  OD: blonde fundus, tortuous vessels, attached, area of hyperfluoresence is poorly viewed on optos  OS: blonde fundus, tortuous vessels, attached    FAF 08/13/21  Right eye: temporal scarring wtith patchy hyperfluoresence, vasculature does not reach temporal periphery  Left eye: temporal periphery w/ mild diffuse hyperautofluorescence, vasculature does not reach the periphery temporallypoor views      OCT 08/16/22   OD - limited views, PHF attached, retina normal where visible  OS -  limited views, PHF attached, retina normal where visible    ASSESSMENT & PLAN    #. 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, no treatment of ROP   - with ectopic maculae and straightened vessels     - VA stable OU within typical range   - no subjective change     - very difficult exam d/t tendency to turn eye extreme left under lids   - limited exam shows retina flat, likely stable   - Optos photos obtained d/t difficulty with DFE     - observe for now   - recheck 1 year    #  Amblyopia OD      # Esotropia, torticollis, and multivector nystagmus   -s/p strabismus surgery at ages 4 and 6- stable   -continue to monitor      4.High myopia and astigmatism        return to clinic: 1 year, photos OU (Optos),           ATTESTATION     Attending Physician Attestation:      Complete documentation of historical and exam elements from today's  encounter can be found in the full encounter summary report (not reduplicated in this progress note).  I personally obtained the chief complaint(s) and history of present illness.  I confirmed and edited as necessary the review of systems, past medical/surgical history, family history, social history, and examination findings as documented by others; and I examined the patient myself.  I personally reviewed the relevant tests, images, and reports as documented above.  I formulated and edited as necessary the assessment and plan and discussed the findings and management plan with the patient and family    Rebeca Oliver MD, PhD  , Vitreoretinal Surgery  Department of Ophthalmology  [facility]

## 2022-08-16 NOTE — NURSING NOTE
Chief Complaints and History of Present Illnesses   Patient presents with     Retinopathy Of Prematurity Follow Up     Chief Complaint(s) and History of Present Illness(es)     Retinopathy Of Prematurity Follow Up     Laterality: both eyes    Onset: present since childhood    Course: stable    Associated symptoms: Negative for eye pain and headaches    Pain scale: 0/10              Comments     Pt states VA seems unchanged. She states her eyes are comfortable, no itching or dryness. No use of art tears. Pt has had no significant interim health concerns.  Selam Boyd, COT COT 7:49 AM 08/16/2022

## 2023-08-23 DIAGNOSIS — H35.103 ROP (RETINOPATHY OF PREMATURITY), BILATERAL: Primary | ICD-10-CM

## 2023-09-08 ENCOUNTER — OFFICE VISIT (OUTPATIENT)
Dept: OPHTHALMOLOGY | Facility: CLINIC | Age: 28
End: 2023-09-08
Attending: OPHTHALMOLOGY
Payer: COMMERCIAL

## 2023-09-08 DIAGNOSIS — H35.103 ROP (RETINOPATHY OF PREMATURITY), BILATERAL: ICD-10-CM

## 2023-09-08 PROCEDURE — 92250 FUNDUS PHOTOGRAPHY W/I&R: CPT | Performed by: OPHTHALMOLOGY

## 2023-09-08 PROCEDURE — 99213 OFFICE O/P EST LOW 20 MIN: CPT | Performed by: OPHTHALMOLOGY

## 2023-09-08 PROCEDURE — G0463 HOSPITAL OUTPT CLINIC VISIT: HCPCS | Performed by: OPHTHALMOLOGY

## 2023-09-08 ASSESSMENT — CUP TO DISC RATIO
OD_RATIO: NO SIGNIFICANT CUPPING WITH GLIMPSES
OS_RATIO: NO SIGNIFICANT CUPPING WITH GLIMPSES

## 2023-09-08 ASSESSMENT — VISUAL ACUITY
OS_CC+: -1
CORRECTION_TYPE: GLASSES
OS_CC: 20/70
OD_CC: 20/150
METHOD: SNELLEN - LINEAR

## 2023-09-08 ASSESSMENT — TONOMETRY
OD_IOP_MMHG: 14
OS_IOP_MMHG: 19
IOP_METHOD: ICARE

## 2023-09-08 ASSESSMENT — EXTERNAL EXAM - LEFT EYE: OS_EXAM: MILD PTOSIS

## 2023-09-08 ASSESSMENT — REFRACTION_WEARINGRX
OS_AXIS: 081
OD_CYLINDER: +3.00
OS_CYLINDER: +1.00
OD_SPHERE: -18.00
SPECS_TYPE: SVL
OD_AXIS: 005
OS_SPHERE: -15.00

## 2023-09-08 ASSESSMENT — CONF VISUAL FIELD
OD_SUPERIOR_NASAL_RESTRICTION: 3
OD_SUPERIOR_TEMPORAL_RESTRICTION: 3
OS_SUPERIOR_TEMPORAL_RESTRICTION: 3
OD_INFERIOR_NASAL_RESTRICTION: 3
OD_INFERIOR_TEMPORAL_RESTRICTION: 3
METHOD: COUNTING FINGERS
OS_INFERIOR_TEMPORAL_RESTRICTION: 3
OS_INFERIOR_NASAL_RESTRICTION: 3
OS_SUPERIOR_NASAL_RESTRICTION: 3

## 2023-09-08 ASSESSMENT — SLIT LAMP EXAM - LIDS
COMMENTS: NORMAL
COMMENTS: NORMAL

## 2023-09-08 ASSESSMENT — EXTERNAL EXAM - RIGHT EYE: OD_EXAM: MILD PTOSIS

## 2023-09-08 NOTE — NURSING NOTE
Chief Complaints and History of Present Illnesses   Patient presents with    Follow Up     ROP (retinopathy of prematurity) 1 year follow up       Chief Complaint(s) and History of Present Illness(es)       Follow Up              Associated symptoms: Negative for eye pain, flashes and floaters    Comments: ROP (retinopathy of prematurity) 1 year follow up                Comments    Pt states eyes are doing well. No pain, flashes, or floater. Vision seems the same. Pt has no concerns  Per pt no drops currently being used    Cher COBB 10:39 AM September 8, 2023

## 2023-09-08 NOTE — PROGRESS NOTES
CC -  cicatricial ROP    INTERVAL HISTORY -   No changes since MING. Vision seems stable.       [facility] -   Belen Erickson is a 28 year old  patient presenting for annual evaluation of cicatricial ROP. Was followed by Dr Oliva.  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  Has seen low vision (2017)       PAST OCULAR SURGERY  Strabismus surgery- 2001 and 2004      RETINAL IMAGING  Optos fundus photos 09/08/2023  moderate quality, pt very photophobic - narrow views through lids  OD: blonde fundus, tortuous vessels, attached, area of hyperfluoresence is poorly viewed on optos  OS: blonde fundus, tortuous vessels, attached    FAF 08/13/21  Right eye: temporal scarring wtith patchy hyperfluoresence, vasculature does not reach temporal periphery  Left eye: temporal periphery w/ mild diffuse hyperautofluorescence, vasculature does not reach the periphery temporallypoor views      OCT 08/16/22   OD - limited views, PHF attached, retina normal where visible  OS -  limited views, PHF attached, retina normal where visible    ASSESSMENT & PLAN    #. 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, no treatment of ROP   - with ectopic maculae and straightened vessels     - VA stable OU within typical range   - no subjective change     - very difficult exam d/t tendency to turn eye extreme left under lids   - limited exam shows retina flat, likely stable   - Optos photos obtained d/t difficulty with DFE     - observe for now   - recheck 1 year    #  Amblyopia OD      # Esotropia, torticollis, and multivector nystagmus   -s/p strabismus surgery at ages 4 and 6- stable   -continue to monitor      # .High myopia and astigmatism   - will check MRx next visit      Return in about 1 year (around 9/8/2024) for Tech Instructions:, DFE OU, Optos Photo, MRx.     ATTESTATION     Attending Physician Attestation:       Complete documentation of historical and exam elements from today's encounter can be found in the full encounter summary report (not reduplicated in this progress note).  I personally obtained the chief complaint(s) and history of present illness.  I confirmed and edited as necessary the review of systems, past medical/surgical history, family history, social history, and examination findings as documented by others; and I examined the patient myself.  I personally reviewed the relevant tests, images, and reports as documented above.  I formulated and edited as necessary the assessment and plan and discussed the findings and management plan with the patient and family    Rebeca Oliver MD, PhD  , Vitreoretinal Surgery  Department of Ophthalmology  [facility]

## 2024-08-29 DIAGNOSIS — H35.103 ROP (RETINOPATHY OF PREMATURITY), BILATERAL: Primary | ICD-10-CM

## 2024-09-12 NOTE — PROGRESS NOTES
CC -  cicatricial ROP    INTERVAL HISTORY -   LV 9/2023, no changes, needs new MRx      PM[facility] -   Belen Erickson is a 29 year old  patient presenting for annual evaluation of cicatricial ROP. Was followed by Dr Oliva.  Very photosensitive and has trouble with DFE  Father often notes slight head tilt, looking over glasses frequently  Was student at [employer] used large print  Has seen low vision (2017)       PAST OCULAR SURGERY  Strabismus surgery- 2001 and 2004      RETINAL IMAGING  Optos fundus photos 09/12/2024  moderate quality, pt very photophobic - narrow views through lids  OD: blonde fundus, tortuous vessels, attached, area of hyperfluoresence is poorly viewed on optos  OS: blonde fundus, tortuous vessels, attached    FAF 08/13/21  Right eye: temporal scarring wtith patchy hyperfluoresence, vasculature does not reach temporal periphery  Left eye: temporal periphery w/ mild diffuse hyperautofluorescence, vasculature does not reach the periphery temporallypoor views      OCT 2021  OD - limited views, PHF attached, retina normal where visible  OS -  limited views, PHF attached, retina normal where visible    ASSESSMENT & PLAN    #. 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, no treatment of ROP   - with ectopic maculae and straightened vessels     - VA stable OU within typical range   - no subjective change     - very difficult exam d/t tendency to turn eye extreme left under lids   - limited exam shows retina flat, likely stable   - Optos photos obtained d/t difficulty with DFE     - observe for now   - recheck 1 year    #  Amblyopia OD      # Esotropia, torticollis, and multivector nystagmus   -s/p strabismus surgery at ages 4 and 6- stable   -continue to monitor      # .High myopia and astigmatism   - will check MRx next visit      Return in about 1 year (around 9/13/2025) for DFE OU, Optos Photo.     ATTESTATION     Attending Physician Attestation:      Complete documentation of historical and  exam elements from today's encounter can be found in the full encounter summary report (not reduplicated in this progress note).  I personally obtained the chief complaint(s) and history of present illness.  I confirmed and edited as necessary the review of systems, past medical/surgical history, family history, social history, and examination findings as documented by others; and I examined the patient myself.  I personally reviewed the relevant tests, images, and reports as documented above.  I formulated and edited as necessary the assessment and plan and discussed the findings and management plan with the patient and family    Rebeca Oliver MD, PhD  , Vitreoretinal Surgery  Department of Ophthalmology  [facility]

## 2024-09-13 ENCOUNTER — OFFICE VISIT (OUTPATIENT)
Dept: OPHTHALMOLOGY | Facility: CLINIC | Age: 29
End: 2024-09-13
Attending: OPHTHALMOLOGY
Payer: COMMERCIAL

## 2024-09-13 DIAGNOSIS — H35.103 ROP (RETINOPATHY OF PREMATURITY), BILATERAL: ICD-10-CM

## 2024-09-13 PROCEDURE — 92015 DETERMINE REFRACTIVE STATE: CPT

## 2024-09-13 PROCEDURE — 99213 OFFICE O/P EST LOW 20 MIN: CPT | Performed by: OPHTHALMOLOGY

## 2024-09-13 PROCEDURE — 92250 FUNDUS PHOTOGRAPHY W/I&R: CPT | Performed by: OPHTHALMOLOGY

## 2024-09-13 PROCEDURE — G0463 HOSPITAL OUTPT CLINIC VISIT: HCPCS | Performed by: OPHTHALMOLOGY

## 2024-09-13 ASSESSMENT — TONOMETRY
IOP_METHOD: ICARE
OD_IOP_MMHG: 14
OS_IOP_MMHG: 15

## 2024-09-13 ASSESSMENT — REFRACTION_MANIFEST
OD_CYLINDER: +3.25
OD_AXIS: 010
OS_CYLINDER: +1.00
OS_SPHERE: -15.00
OS_AXIS: 075
OD_SPHERE: -18.50

## 2024-09-13 ASSESSMENT — SLIT LAMP EXAM - LIDS
COMMENTS: NORMAL
COMMENTS: NORMAL

## 2024-09-13 ASSESSMENT — REFRACTION_WEARINGRX
OS_SPHERE: -15.00
OS_CYLINDER: +1.00
OS_AXIS: 081
OD_SPHERE: -18.00
OD_CYLINDER: +3.00
OD_AXIS: 005
SPECS_TYPE: SVL

## 2024-09-13 ASSESSMENT — CONF VISUAL FIELD
OD_INFERIOR_TEMPORAL_RESTRICTION: 0
OS_INFERIOR_TEMPORAL_RESTRICTION: 3
OD_SUPERIOR_TEMPORAL_RESTRICTION: 0
OS_SUPERIOR_NASAL_RESTRICTION: 0
OS_INFERIOR_NASAL_RESTRICTION: 3
OD_INFERIOR_NASAL_RESTRICTION: 3
OS_SUPERIOR_TEMPORAL_RESTRICTION: 0
OD_SUPERIOR_NASAL_RESTRICTION: 0

## 2024-09-13 ASSESSMENT — CUP TO DISC RATIO
OS_RATIO: NO SIGNIFICANT CUPPING WITH GLIMPSES
OD_RATIO: NO SIGNIFICANT CUPPING WITH GLIMPSES

## 2024-09-13 ASSESSMENT — EXTERNAL EXAM - LEFT EYE: OS_EXAM: MILD PTOSIS

## 2024-09-13 ASSESSMENT — VISUAL ACUITY
OD_CC: 20/300
OS_CC+: -1
OS_CC: 20/80
METHOD: SNELLEN - LINEAR

## 2024-09-13 ASSESSMENT — EXTERNAL EXAM - RIGHT EYE: OD_EXAM: MILD PTOSIS
